# Patient Record
Sex: FEMALE | Race: WHITE | NOT HISPANIC OR LATINO
[De-identification: names, ages, dates, MRNs, and addresses within clinical notes are randomized per-mention and may not be internally consistent; named-entity substitution may affect disease eponyms.]

---

## 2018-08-20 ENCOUNTER — APPOINTMENT (OUTPATIENT)
Dept: OTOLARYNGOLOGY | Facility: CLINIC | Age: 73
End: 2018-08-20

## 2018-09-25 ENCOUNTER — APPOINTMENT (OUTPATIENT)
Dept: OTOLARYNGOLOGY | Facility: CLINIC | Age: 73
End: 2018-09-25
Payer: MEDICARE

## 2018-09-25 VITALS
SYSTOLIC BLOOD PRESSURE: 123 MMHG | BODY MASS INDEX: 28 KG/M2 | HEIGHT: 64 IN | DIASTOLIC BLOOD PRESSURE: 76 MMHG | HEART RATE: 69 BPM | WEIGHT: 164 LBS

## 2018-09-25 DIAGNOSIS — J34.2 DEVIATED NASAL SEPTUM: ICD-10-CM

## 2018-09-25 DIAGNOSIS — R09.81 NASAL CONGESTION: ICD-10-CM

## 2018-09-25 PROCEDURE — 31231 NASAL ENDOSCOPY DX: CPT

## 2018-09-25 PROCEDURE — 99204 OFFICE O/P NEW MOD 45 MIN: CPT | Mod: 25

## 2018-09-25 RX ORDER — ESCITALOPRAM OXALATE 10 MG/1
10 TABLET ORAL
Refills: 0 | Status: ACTIVE | COMMUNITY

## 2019-03-19 ENCOUNTER — APPOINTMENT (OUTPATIENT)
Dept: OTOLARYNGOLOGY | Facility: CLINIC | Age: 74
End: 2019-03-19

## 2020-11-12 ENCOUNTER — TRANSCRIPTION ENCOUNTER (OUTPATIENT)
Age: 75
End: 2020-11-12

## 2021-01-08 ENCOUNTER — NON-APPOINTMENT (OUTPATIENT)
Age: 76
End: 2021-01-08

## 2021-02-03 ENCOUNTER — NON-APPOINTMENT (OUTPATIENT)
Age: 76
End: 2021-02-03

## 2021-02-16 ENCOUNTER — APPOINTMENT (OUTPATIENT)
Dept: CARDIOLOGY | Facility: CLINIC | Age: 76
End: 2021-02-16
Payer: MEDICARE

## 2021-02-16 ENCOUNTER — NON-APPOINTMENT (OUTPATIENT)
Age: 76
End: 2021-02-16

## 2021-02-16 VITALS
HEART RATE: 64 BPM | BODY MASS INDEX: 29.52 KG/M2 | SYSTOLIC BLOOD PRESSURE: 162 MMHG | DIASTOLIC BLOOD PRESSURE: 90 MMHG | WEIGHT: 172 LBS | OXYGEN SATURATION: 95 %

## 2021-02-16 DIAGNOSIS — I34.1 NONRHEUMATIC MITRAL (VALVE) PROLAPSE: ICD-10-CM

## 2021-02-16 DIAGNOSIS — R06.02 SHORTNESS OF BREATH: ICD-10-CM

## 2021-02-16 PROCEDURE — 99204 OFFICE O/P NEW MOD 45 MIN: CPT

## 2021-02-16 PROCEDURE — 93000 ELECTROCARDIOGRAM COMPLETE: CPT

## 2021-02-16 RX ORDER — LOSARTAN POTASSIUM 50 MG/1
50 TABLET, FILM COATED ORAL
Refills: 0 | Status: ACTIVE | COMMUNITY

## 2021-02-16 RX ORDER — METOPROLOL TARTRATE 50 MG/1
50 TABLET, FILM COATED ORAL
Refills: 0 | Status: ACTIVE | COMMUNITY

## 2021-02-16 RX ORDER — ZOLEDRONIC ACID 5 MG/100ML
5 INJECTION INTRAVENOUS
Refills: 0 | Status: DISCONTINUED | COMMUNITY
End: 2021-02-16

## 2021-02-16 RX ORDER — DEXLANSOPRAZOLE 60 MG/1
60 CAPSULE, DELAYED RELEASE ORAL
Refills: 0 | Status: ACTIVE | COMMUNITY

## 2021-02-16 RX ORDER — DENOSUMAB 60 MG/ML
60 INJECTION SUBCUTANEOUS
Refills: 0 | Status: ACTIVE | COMMUNITY

## 2021-02-16 NOTE — ASSESSMENT
[FreeTextEntry1] : Fiordaliza Tesfaye has hypertension, chronic cough unlikely related to any cardiac issue possibly related to gastroesophageal reflux disease, history of mitral valve prolapse though echo in 2014 did not reveal classic prolapse and no significant mitral regurgitation with normal left ventricular function.  She does have some exertional shortness of breath most likely related to her weight and deconditioning.  Her EKG is normal.  1.  Hypertension, with large cuff blood pressure appears to be normal on mild medication.  Patient however is not careful with salt in her diet which was discussed in detail  2.  Normal EKG  3.  Hyperlipidemia on Lipitor 10  4.  History of mitral valve prolapse though clinically no evidence of mitral valve prolapse or mitral regurgitation.

## 2021-02-16 NOTE — DISCUSSION/SUMMARY
[FreeTextEntry1] : I reassured her that I did not feel there are any active cardiac issues.  I have ordered an echocardiogram which will be performed in the next day or 2 mainly for reassurance is clinically I do not feel there is any significant valvular disease prolapse or evidence of LV dysfunction\par \par I urged to lose weight and be more active and to decrease the salt in her diet and continue her present regimen of medications\par \par Patient will follow with me again in 3 months

## 2021-02-16 NOTE — PHYSICAL EXAM
[Normal Appearance] : normal appearance [Well Groomed] : well groomed [General Appearance - Well Nourished] : well nourished [Normal Conjunctiva] : the conjunctiva exhibited no abnormalities [No Jugular Venous Diaz A Waves] : no jugular venous diaz A waves [Heart Sounds] : normal S1 and S2 [Murmurs] : no murmurs present [Arterial Pulses Normal] : the arterial pulses were normal [Respiration, Rhythm And Depth] : normal respiratory rhythm and effort [Auscultation Breath Sounds / Voice Sounds] : lungs were clear to auscultation bilaterally [Abdomen Soft] : soft [Abdomen Tenderness] : non-tender [FreeTextEntry1] : Somewhat protuberant [Cyanosis, Localized] : no localized cyanosis [Oriented To Time, Place, And Person] : oriented to person, place, and time [Affect] : the affect was normal [Mood] : the mood was normal

## 2021-02-16 NOTE — REVIEW OF SYSTEMS
[Shortness Of Breath] : shortness of breath [Dyspnea on exertion] : dyspnea during exertion [Chest  Pressure] : no chest pressure [Chest Pain] : no chest pain [Lower Ext Edema] : no extremity edema [Palpitations] : no palpitations [Cough] : cough [Dizziness] : no dizziness [Numbness (Hypesthesia)] : no numbness [Tingling (Paresthesia)] : no tingling [Anxiety] : anxiety [Negative] : Neurological

## 2021-02-16 NOTE — HISTORY OF PRESENT ILLNESS
[FreeTextEntry1] : Taylor Tesfaye 75 years old history of hypertension, hyperlipidemia, non-smoker, no family history of coronary disease.  She carries a diagnosis of mitral valve prolapse.  Her last echo in 2014 revealed normal LV function and minimal mitral regurgitation without clear evidence of mitral valve prolapse\par \par She has a chronic cough with no sputum production unrelated to exercise.  She has no exertional chest pain but does have some exertional shortness of breath.  She is overweight and is not careful with salt in her diet.\par \par She has been started on antihypertensive medicines.\par \par She has had no significant hospitalizations other than childbirth and has no allergies.\par She has been treated with excellent for the cough with a diagnosis of possible gastroesophageal reflux disease\par \par EKG today normal sinus rhythm within normal limits.

## 2021-02-17 ENCOUNTER — APPOINTMENT (OUTPATIENT)
Dept: CARDIOLOGY | Facility: CLINIC | Age: 76
End: 2021-02-17
Payer: MEDICARE

## 2021-02-17 PROCEDURE — 93306 TTE W/DOPPLER COMPLETE: CPT
